# Patient Record
(demographics unavailable — no encounter records)

---

## 2019-07-30 NOTE — NUR
PT RECIEVED FROM [facility]. PT RESTING IN BED COMFORTABLY, SISTER AT [facility].
PT STATES DULL INTERMITTANT PAIN IN GENITALS AND DYSURIA. PT DENIED THE NEED
FOR PAIN MEDS AT THIS TIME. BED AT LOWEST POSITION. CALL LIGHT WITHIN REACH.
WILL CONTINUE TO MONITOR.

## 2019-07-30 NOTE — NUR
PT BIB SELF WITH C/O DYSURIA AND FREQUENT URINATION SINCE SUNDAY.
AT BEDSIDE PT IS AAOX4, RESPS E/U, SKIN IS PINK, WARN AND DRY. PERRLA. ALSO,
PT IS CALM AND COOPERATIVE. PT AMBULATED FROM LOBBY TO ROOM WITH STEADY GAIT
AND NO ASSIST.  PT PLACED ON MONITOR.
PT ORIENTED TO ROOM, USE OF CALL BELL AND BED IN LOWEST POSITION. BED RAIL
IS UP X1 FOR SAFETY.

## 2019-07-30 NOTE — NUR
RECEIVED PT VIA W/C FROM E/D, ACCOMPANIED BY TRANSPORTER AND PT'S SISTER,
JOSÉ MANUEL JONES. PT A/A/O X 4, ANXIOUS R/T HOSPITAL STAY, BUT COOPERATIVE TO CARE
AT THIS TIME. AMBULATORY, NO GAIT OR BALANCE IMPAIRMENT NOTED; STATED THAT HE
HAD HX OF FALLING 2/2 NAUSEA W/IN LAST 3 MONTHS 2/2 "NAUSEA"; FALL RISK
PROTOCOL IN PLACE. DENIES CHEST PAIN OR DISCOMFORT AT THIS TIME. NO ACUTE
RESPIRATORY DISTRESS NOTED. ABD SOFT, ROUND, NON-TENDER, NORMOACTIVE BOWEL
SOUNDS X 4 QUADS, LAST BM 07/30/19, HARD. C/O BURNING UPON URINATION 10/10. IV
SITE RAC 20G, CDI. ORIENTED PT TO ROOM, BED CONTROLS, CALL LIGHT SYSTEM. SIDE
RAILS UP X 2, BED IN LOW POSITION. WILL ENDORSE TO BHAVANA MURRAY.

## 2019-07-30 NOTE — NUR
PT IN [facility] IN POSITION OF COMFORT, RESP E/U, NO DISTRESS. FEMALE VISITOR AT
BEDSIDE.

## 2019-07-31 NOTE — NUR
PT IS AAOX4. RESP EVEN AND UNLABORED. NO DISTRESS NOTED. PT DENIES BLADDER
PAIN AT THIS TIME. STATE HE STILL HAS URINARY FREQUENCY. IV CATH NS LOCKED TO
LFA. SITE WNL. NO S/S OF INFECTION NOTED. WILL ENDORSE ALL CARE TO NOC RN.

## 2019-07-31 NOTE — NUR
PT IS SLEEPING BUT EASILY AROUSABLE. RESP EVEN AND UNLABORED. NO DISTRESS
NOTED. CALL LIGHT WITHIN REACH.

## 2019-07-31 NOTE — NUR
PT IS AAOX4. NORMAL S1S2 NOTED. RESP EVEN AND UNLABORED. LUNG SOUNDS CTA. ON
R/A. NO COUGH OR SOB. ON R/A. ABDOMEN SOFT, NONTENDER, NONDISTENDED. BOWEL
SOUNDS ACTIVE X 4 QUADS. DENIES N/V/D. PT HAS DX OF UTI WITH COMPLIANTS OF
BLADDER PAIN AND URINARY FREQUENCY. PT BLADDER PAIN AT 4/10. PT STATED HE DOES
NOT NEED PAIN MEDICATION AT THIS TIME. SKIN CDI. NO EDEMA NOTED. PERIPHERAL
PULSES MODERATELY PALPABLE. CAP REFILL < 3 SECONDS. IV CATH N/S LOCKED TO LFA.
SITE WNL. NO S/S OF INFECTION OR INFILTRATION. CALL LIGHT WITHIN REACH. BED IN
LOWEST POSITION.

## 2019-07-31 NOTE — NUR
RECEIVED PT FROM DAY SHIFT RN. PT AAOX4. PT DENIES HEADAHCE OR DIZZINESS. MED
SURG PT, DENIES CHEST PAIN OR PRESSURE. LUNG SOUNDS CTA ON RA. NO SIGNS OF
DISTRESS. PT AMBULATORY. IV LFA PATENT. PT DENIES ANY PAIN. CALL BUTTON WITHIN
REACH. SAFETY PRECAUTIONS IN PLACE. WILL CONTINUE TO MONITOR.

## 2019-07-31 NOTE — NUR
TYLENOL 650MG PO GIVEN FOR ACHING BLADDER PAIN 5/10. EXTRA FLUIDS GIVEN. RESP
EVEN AND UNLABORED. NO OTHER DISTRESS NOTED. PT VISITING WITH NIECE AT BESIDE.
CALL LIGHT WITHIN REACH.

## 2019-07-31 NOTE — NUR
PT RESTING IN BED. STATES HE IS HAVING TO GO TO THE BATHROOM EVERY 10 MINS AND
CANNOT SLEEP. PROVIDED PT WITH BED SIDE URINAL. BREATHING EVEN AND UNLABORED
ON RA. DENIES CP,NV, DIZZINESS, OR PALPATATIONS. NO ACUTE DISTRESS NOTED. NO
SIGNIFICANT CHANGES THIS SHIFT. BED AT LOWEST POSITION. CALL LIGHT WITHIN
REACH. WILL ENDORSE TO DAY NURSE.

## 2019-07-31 NOTE — NUR
TYLENOL 650MG PO GIVEN FOR SHARP PULSATING BLADDER PAIN 5/10. PT TAUGHT TO
DRINK EXTRA FLUIDS DUE TO UTI AND TYLENOL. PT VERBALIZED UNDERSTANDING. CALL
LIGHT WITHIN REACH.

## 2019-07-31 NOTE — NUR
RECIEVED PT FROM NURSE TIMOTHY RN. PT IS A/O X4 LAYING IN BED. PT IS MED SURG,
DENIES ANY CHEST PAIN OR SOB AT THIS TIME. PT HAS PALPABLE PULSES, NO EDEMA
NOTED. PT HAS CLEAR LUNG SOUNDS ON RA. BREATHING EVEN AND UNLABORED. PT HAS
ACTIVE BOWEL SOUNDS, LAST BM 7/31. PT HAS DYSURIA. PT IS AMBULATORY. PT DENIES
ANY PAIN AT THIS TIME. PT IV TO LFA S/L CDI. WILL CONT TO MONITOR, CALL LIGHT
WITHIN REACH.

## 2019-07-31 NOTE — NUR
PT RESTING IN BED COMFORTABLY. NO S/S OF PAIN OR DISCOMFORT AT THIS TIME.
BREATHING EVEN AND UNLABORED ON RA. NO ACUTE DISTRESS NOTED. BED AT LOWEST
POSITION. CALL LIGHT WITHIN REACH. WILL CONTINUE TO MONITOR.

## 2019-08-01 NOTE — NUR
RECEIVED PATIENT FROM BHAVANA BENOIT, PATIENT IN BED AT THIS TIME. STATES THAT HE
HAS NO PAIN BUT HE HAS NOT GOTTEN ANY SLEEP FOR PAST TWO DAYS. INFORMED
PATIENT THAT THIS NURSE WILL SPEAK WITH DR SWAN WHEN HE COMES IN, AND WILL
ADMINISTER PRN NORCO THIS AM WITH BREAKFAST.  CALL LIGHT IN REACH AT THIS
TIME.

## 2019-08-01 NOTE — NUR
PT IN BED AWAKE WATCHING TV. BREATHING EVEN AND UNLABORED. NO RESP DISTRESS
NOTED. CALL LIGHT WITHIN REACH. WILL CONT TO MONITOR.

## 2019-08-01 NOTE — NUR
PT C/O OF PAIN WHEN UNRINATING. GAVE NORCO X1 PER MD ORDER. WILL CONT TO
MONITOR. CALL LIGHT WITHIN REACH.

## 2019-08-01 NOTE — NUR
RECEIVED REPORT FROM AM NURSE. PT LAYING DOWN IN BED. PT AAOX4, FOLLOW
COMMANDS. ABLE TO MAKE NEEDS KNOWN. MED-SURG. DENIES CP/PRESSURE AT THIS TIME
PALPABLE PULSES TO ALL EXTREMITIES. NO EDEMA NOTED. LUNG SOUNDS CTA ON RA.
BREATHING EVEN AND UNLABORED. ABD SOFT AND NONDISTENDED. ACTIVE BS X4 QUAD.
DENIES ANY N/V/D. LAST BM 8/1/19. VOIDS FREELY BRP. C/O DYSURIA AND URGE TO
PEE. STATED HE HAS BEEN GOING TO URINATE EVERY 45MIN TO AND 1HR.  AMBULATORY.
IV SL TO LFA FLUSHING WELL.  SITE FREE FROM REDNESS AND SWELLING.  NO ACUTE
DISTRESS NOTED.  BED AT LOWEST SETTING. SIDE RAILS X2 UP. CALL LIGHT WITHING
REACH. WILL CONTINUE TO MINITOR.

## 2019-08-01 NOTE — NUR
PATIENT IN BED RESTING, WATCHING TELEVISION. STATES THAT HIS URINARY FREQUENCY
HAS DECREASED, STATING HE IS GOING "ABOUT EVERY HOUR, LAST NIGHT WAS EVERY 15
MINS." WILL CONTINUE TO MONITOR, CALL LIGHT IN REACH, REFUSES PAIN MEDICATION
AND DENIES ANXIETY.

## 2019-08-01 NOTE — NUR
REPORTED GIVEN TO BHAVANA GAYTAN. PATIENT IN BED AT THIS TIME, NO COMPLAINTS AT
THIS TIME. INFORMED PATIENT THAT BHAVANA GAYTAN AWARE OF JASSI KNIGHT AND WANT FOR
SHOWER TONIGHT. CALL LIGHT IN REACH.

## 2019-08-01 NOTE — NUR
PT SLEPT ON AND OFF THROUGHT THE NIGHT. PT BREATHING EVEN AND UNLABORED. NO
RESP DISTRESS NOTED. PT DENIES ANY CHEST PAIN OR SOB AT THIS TIME. PT C/O OF
PAIN WHEN URINATING, GAVE NORCO X1. PT WSA COOPERATIVE WITH NURSING CARE. IV
TO LFA CDI. WILL ENDORSE CARE TO DAY SHIFT NURSE.

## 2019-08-01 NOTE — NUR
DR SWAN IN TO SPEAK WITH PATIENT ABOUT PLAN OF CARE. STATES PATIENT TO STAY AT
LEAST ONE MORE DAY. PATIENT AGREES, QUESTIONS ADDRESSED BY DR SWAN. CALL LIGHT
IN REACH.

## 2019-08-02 NOTE — NUR
PT SLEPT AT INTERVALS THROGHOUT THE NIGHT. BREATHING EVEN AND UNLABORED ON RA.
NO ACUTE DISTRESS NOTED. ALL NEEDS ASSESSED AND ATTENDED TO. BED AT LOWEST
SETTING. SIDE RAILS X2 UP. CALL LIGHT WITHING REACH. WILL ENDORSE CARE TO AM
NURSE.

## 2019-08-02 NOTE — NUR
PT LAYING DOWN IN BED WITH EYES CLOSED. BREATHING EVEN AND UNLABORED ON RA.
NO ACUTE DISTRES NOTED. BED AT LOWEST SETTING. SIDE RAILS X2 UP. CALL LIGHT
WITHING REACH.  WILL CONTINUE TO MONITOR.

## 2019-08-02 NOTE — NUR
SHIFT ASSESSMENT DONE. PATIENT A/A/OX4. CLEAR SPEECH. DENIED CHEST PAIN. NO
RESP DISTRESS ON RA. TOLERATED REGULAR DIET BREAKFAST. NO N/V. AMBULATORY.
IVHL'D TO [facility]. IV SITE CLEAN. C/O BURING OF URINATION AND DYSURIA. ENCOURAGE
TO DRINK ENOUGH WATER. CALL LIGHT IN REACH.